# Patient Record
Sex: FEMALE | Race: WHITE | NOT HISPANIC OR LATINO
[De-identification: names, ages, dates, MRNs, and addresses within clinical notes are randomized per-mention and may not be internally consistent; named-entity substitution may affect disease eponyms.]

---

## 2017-02-14 ENCOUNTER — RECORDS - HEALTHEAST (OUTPATIENT)
Dept: ADMINISTRATIVE | Facility: OTHER | Age: 47
End: 2017-02-14

## 2017-08-01 ENCOUNTER — RECORDS - HEALTHEAST (OUTPATIENT)
Dept: ADMINISTRATIVE | Facility: OTHER | Age: 47
End: 2017-08-01

## 2017-08-02 ENCOUNTER — OFFICE VISIT - HEALTHEAST (OUTPATIENT)
Dept: FAMILY MEDICINE | Facility: CLINIC | Age: 47
End: 2017-08-02

## 2017-08-02 DIAGNOSIS — Z00.00 ROUTINE GENERAL MEDICAL EXAMINATION AT A HEALTH CARE FACILITY: ICD-10-CM

## 2017-08-02 DIAGNOSIS — Z12.4 PAP SMEAR FOR CERVICAL CANCER SCREENING: ICD-10-CM

## 2017-08-02 DIAGNOSIS — C43.72: ICD-10-CM

## 2017-08-02 RX ORDER — B-COMPLEX WITH VITAMIN C
TABLET ORAL
Status: SHIPPED | COMMUNITY
Start: 2017-08-02

## 2017-08-02 ASSESSMENT — MIFFLIN-ST. JEOR: SCORE: 1265.19

## 2017-08-07 LAB
HPV INTERPRETATION - HISTORICAL: NORMAL
HPV INTERPRETER - HISTORICAL: NORMAL

## 2017-08-08 ENCOUNTER — AMBULATORY - HEALTHEAST (OUTPATIENT)
Dept: FAMILY MEDICINE | Facility: CLINIC | Age: 47
End: 2017-08-08

## 2017-08-09 LAB
BKR LAB AP ABNORMAL BLEEDING: NO
BKR LAB AP BIRTH CONTROL/HORMONES: NORMAL
BKR LAB AP CERVICAL APPEARANCE: NORMAL
BKR LAB AP GYN ADEQUACY: NORMAL
BKR LAB AP GYN INTERPRETATION: NORMAL
BKR LAB AP HPV REFLEX: NORMAL
BKR LAB AP LMP: NORMAL
BKR LAB AP PATIENT STATUS: NORMAL
BKR LAB AP PREVIOUS ABNORMAL: NO
BKR LAB AP PREVIOUS NORMAL: 2015
HIGH RISK?: NO
PATH REPORT.COMMENTS IMP SPEC: NORMAL
RESULT FLAG (HE HISTORICAL CONVERSION): NORMAL

## 2017-08-11 ENCOUNTER — COMMUNICATION - HEALTHEAST (OUTPATIENT)
Dept: FAMILY MEDICINE | Facility: CLINIC | Age: 47
End: 2017-08-11

## 2018-02-06 ENCOUNTER — RECORDS - HEALTHEAST (OUTPATIENT)
Dept: ADMINISTRATIVE | Facility: OTHER | Age: 48
End: 2018-02-06

## 2018-08-28 ENCOUNTER — RECORDS - HEALTHEAST (OUTPATIENT)
Dept: ADMINISTRATIVE | Facility: OTHER | Age: 48
End: 2018-08-28

## 2018-09-24 ENCOUNTER — AMBULATORY - HEALTHEAST (OUTPATIENT)
Dept: MULTI SPECIALTY CLINIC | Facility: CLINIC | Age: 48
End: 2018-09-24

## 2018-12-12 ENCOUNTER — COMMUNICATION - HEALTHEAST (OUTPATIENT)
Dept: FAMILY MEDICINE | Facility: CLINIC | Age: 48
End: 2018-12-12

## 2018-12-13 ENCOUNTER — AMBULATORY - HEALTHEAST (OUTPATIENT)
Dept: FAMILY MEDICINE | Facility: CLINIC | Age: 48
End: 2018-12-13

## 2018-12-18 ENCOUNTER — COMMUNICATION - HEALTHEAST (OUTPATIENT)
Dept: FAMILY MEDICINE | Facility: CLINIC | Age: 48
End: 2018-12-18

## 2019-02-26 ENCOUNTER — RECORDS - HEALTHEAST (OUTPATIENT)
Dept: ADMINISTRATIVE | Facility: OTHER | Age: 49
End: 2019-02-26

## 2019-06-14 ENCOUNTER — RECORDS - HEALTHEAST (OUTPATIENT)
Dept: ADMINISTRATIVE | Facility: OTHER | Age: 49
End: 2019-06-14

## 2019-08-01 ENCOUNTER — OFFICE VISIT - HEALTHEAST (OUTPATIENT)
Dept: FAMILY MEDICINE | Facility: CLINIC | Age: 49
End: 2019-08-01

## 2019-08-01 DIAGNOSIS — Z12.11 SCREEN FOR COLON CANCER: ICD-10-CM

## 2019-08-01 DIAGNOSIS — C43.72: ICD-10-CM

## 2019-08-01 DIAGNOSIS — I10 BENIGN ESSENTIAL HYPERTENSION: ICD-10-CM

## 2019-08-01 DIAGNOSIS — Z00.00 ROUTINE GENERAL MEDICAL EXAMINATION AT A HEALTH CARE FACILITY: ICD-10-CM

## 2019-08-01 DIAGNOSIS — Z12.31 VISIT FOR SCREENING MAMMOGRAM: ICD-10-CM

## 2019-08-01 DIAGNOSIS — M54.50 ACUTE LEFT-SIDED LOW BACK PAIN WITHOUT SCIATICA: ICD-10-CM

## 2019-08-01 LAB
ANION GAP SERPL CALCULATED.3IONS-SCNC: 9 MMOL/L (ref 5–18)
BUN SERPL-MCNC: 19 MG/DL (ref 8–22)
CALCIUM SERPL-MCNC: 9.7 MG/DL (ref 8.5–10.5)
CHLORIDE BLD-SCNC: 105 MMOL/L (ref 98–107)
CHOLEST SERPL-MCNC: 268 MG/DL
CO2 SERPL-SCNC: 25 MMOL/L (ref 22–31)
CREAT SERPL-MCNC: 0.8 MG/DL (ref 0.6–1.1)
ERYTHROCYTE [DISTWIDTH] IN BLOOD BY AUTOMATED COUNT: 11.6 % (ref 11–14.5)
FASTING STATUS PATIENT QL REPORTED: YES
GFR SERPL CREATININE-BSD FRML MDRD: >60 ML/MIN/1.73M2
GLUCOSE BLD-MCNC: 88 MG/DL (ref 70–125)
HCT VFR BLD AUTO: 45 % (ref 35–47)
HDLC SERPL-MCNC: 101 MG/DL
HGB BLD-MCNC: 15.1 G/DL (ref 12–16)
LDLC SERPL CALC-MCNC: 155 MG/DL
MCH RBC QN AUTO: 31.6 PG (ref 27–34)
MCHC RBC AUTO-ENTMCNC: 33.6 G/DL (ref 32–36)
MCV RBC AUTO: 94 FL (ref 80–100)
PLATELET # BLD AUTO: 221 THOU/UL (ref 140–440)
PMV BLD AUTO: 6.9 FL (ref 7–10)
POTASSIUM BLD-SCNC: 4.3 MMOL/L (ref 3.5–5)
RBC # BLD AUTO: 4.78 MILL/UL (ref 3.8–5.4)
SODIUM SERPL-SCNC: 139 MMOL/L (ref 136–145)
TRIGL SERPL-MCNC: 61 MG/DL
TSH SERPL DL<=0.005 MIU/L-ACNC: 0.81 UIU/ML (ref 0.3–5)
WBC: 5.1 THOU/UL (ref 4–11)

## 2019-08-01 ASSESSMENT — MIFFLIN-ST. JEOR: SCORE: 1224.37

## 2019-08-02 LAB — 25(OH)D3 SERPL-MCNC: 40.4 NG/ML (ref 30–80)

## 2019-08-08 ENCOUNTER — COMMUNICATION - HEALTHEAST (OUTPATIENT)
Dept: FAMILY MEDICINE | Facility: CLINIC | Age: 49
End: 2019-08-08

## 2019-08-19 ENCOUNTER — RECORDS - HEALTHEAST (OUTPATIENT)
Dept: ADMINISTRATIVE | Facility: OTHER | Age: 49
End: 2019-08-19

## 2019-09-06 ENCOUNTER — COMMUNICATION - HEALTHEAST (OUTPATIENT)
Dept: ADMINISTRATIVE | Facility: CLINIC | Age: 49
End: 2019-09-06

## 2019-10-15 ENCOUNTER — RECORDS - HEALTHEAST (OUTPATIENT)
Dept: ADMINISTRATIVE | Facility: OTHER | Age: 49
End: 2019-10-15

## 2019-10-31 ENCOUNTER — OFFICE VISIT - HEALTHEAST (OUTPATIENT)
Dept: FAMILY MEDICINE | Facility: CLINIC | Age: 49
End: 2019-10-31

## 2019-10-31 DIAGNOSIS — Z01.818 PREOP GENERAL PHYSICAL EXAM: ICD-10-CM

## 2019-10-31 LAB
ATRIAL RATE - MUSE: 67 BPM
DIASTOLIC BLOOD PRESSURE - MUSE: NORMAL
INTERPRETATION ECG - MUSE: NORMAL
P AXIS - MUSE: 67 DEGREES
PR INTERVAL - MUSE: 146 MS
QRS DURATION - MUSE: 74 MS
QT - MUSE: 404 MS
QTC - MUSE: 426 MS
R AXIS - MUSE: 59 DEGREES
SYSTOLIC BLOOD PRESSURE - MUSE: NORMAL
T AXIS - MUSE: 57 DEGREES
VENTRICULAR RATE- MUSE: 67 BPM

## 2019-10-31 RX ORDER — CYCLOBENZAPRINE HCL 5 MG
TABLET ORAL
Refills: 0 | Status: SHIPPED | COMMUNITY
Start: 2019-10-30

## 2019-10-31 ASSESSMENT — MIFFLIN-ST. JEOR: SCORE: 1242.51

## 2019-11-21 ENCOUNTER — OFFICE VISIT - HEALTHEAST (OUTPATIENT)
Dept: FAMILY MEDICINE | Facility: CLINIC | Age: 49
End: 2019-11-21

## 2019-11-21 ENCOUNTER — COMMUNICATION - HEALTHEAST (OUTPATIENT)
Dept: SCHEDULING | Facility: CLINIC | Age: 49
End: 2019-11-21

## 2019-11-21 DIAGNOSIS — I10 BENIGN ESSENTIAL HYPERTENSION: ICD-10-CM

## 2019-11-21 ASSESSMENT — PATIENT HEALTH QUESTIONNAIRE - PHQ9: SUM OF ALL RESPONSES TO PHQ QUESTIONS 1-9: 0

## 2019-11-21 ASSESSMENT — ANXIETY QUESTIONNAIRES
3. WORRYING TOO MUCH ABOUT DIFFERENT THINGS: SEVERAL DAYS
1. FEELING NERVOUS, ANXIOUS, OR ON EDGE: MORE THAN HALF THE DAYS
IF YOU CHECKED OFF ANY PROBLEMS ON THIS QUESTIONNAIRE, HOW DIFFICULT HAVE THESE PROBLEMS MADE IT FOR YOU TO DO YOUR WORK, TAKE CARE OF THINGS AT HOME, OR GET ALONG WITH OTHER PEOPLE: NOT DIFFICULT AT ALL
GAD7 TOTAL SCORE: 7
7. FEELING AFRAID AS IF SOMETHING AWFUL MIGHT HAPPEN: MORE THAN HALF THE DAYS
2. NOT BEING ABLE TO STOP OR CONTROL WORRYING: MORE THAN HALF THE DAYS
6. BECOMING EASILY ANNOYED OR IRRITABLE: NOT AT ALL
4. TROUBLE RELAXING: NOT AT ALL
5. BEING SO RESTLESS THAT IT IS HARD TO SIT STILL: NOT AT ALL

## 2019-11-21 ASSESSMENT — MIFFLIN-ST. JEOR: SCORE: 1219.83

## 2019-12-20 ENCOUNTER — RECORDS - HEALTHEAST (OUTPATIENT)
Dept: ADMINISTRATIVE | Facility: OTHER | Age: 49
End: 2019-12-20

## 2019-12-26 ENCOUNTER — OFFICE VISIT - HEALTHEAST (OUTPATIENT)
Dept: FAMILY MEDICINE | Facility: CLINIC | Age: 49
End: 2019-12-26

## 2019-12-26 DIAGNOSIS — I10 BENIGN ESSENTIAL HYPERTENSION: ICD-10-CM

## 2019-12-26 DIAGNOSIS — R06.81 WITNESSED APNEIC SPELLS: ICD-10-CM

## 2019-12-26 LAB
ANION GAP SERPL CALCULATED.3IONS-SCNC: 9 MMOL/L (ref 5–18)
BUN SERPL-MCNC: 19 MG/DL (ref 8–22)
CALCIUM SERPL-MCNC: 10 MG/DL (ref 8.5–10.5)
CHLORIDE BLD-SCNC: 104 MMOL/L (ref 98–107)
CO2 SERPL-SCNC: 28 MMOL/L (ref 22–31)
CREAT SERPL-MCNC: 0.96 MG/DL (ref 0.6–1.1)
GFR SERPL CREATININE-BSD FRML MDRD: >60 ML/MIN/1.73M2
GLUCOSE BLD-MCNC: 101 MG/DL (ref 70–125)
POTASSIUM BLD-SCNC: 5.2 MMOL/L (ref 3.5–5)
SODIUM SERPL-SCNC: 141 MMOL/L (ref 136–145)

## 2019-12-26 ASSESSMENT — MIFFLIN-ST. JEOR: SCORE: 1206.23

## 2019-12-27 ENCOUNTER — COMMUNICATION - HEALTHEAST (OUTPATIENT)
Dept: FAMILY MEDICINE | Facility: CLINIC | Age: 49
End: 2019-12-27

## 2020-03-09 ENCOUNTER — RECORDS - HEALTHEAST (OUTPATIENT)
Dept: ADMINISTRATIVE | Facility: OTHER | Age: 50
End: 2020-03-09

## 2020-04-08 ENCOUNTER — RECORDS - HEALTHEAST (OUTPATIENT)
Dept: ADMINISTRATIVE | Facility: OTHER | Age: 50
End: 2020-04-08

## 2020-10-07 ENCOUNTER — COMMUNICATION - HEALTHEAST (OUTPATIENT)
Dept: FAMILY MEDICINE | Facility: CLINIC | Age: 50
End: 2020-10-07

## 2020-10-07 DIAGNOSIS — I10 BENIGN ESSENTIAL HYPERTENSION: ICD-10-CM

## 2020-10-09 RX ORDER — LISINOPRIL 10 MG/1
10 TABLET ORAL DAILY
Qty: 90 TABLET | Refills: 0 | Status: SHIPPED | OUTPATIENT
Start: 2020-10-09

## 2020-11-05 ENCOUNTER — RECORDS - HEALTHEAST (OUTPATIENT)
Dept: ADMINISTRATIVE | Facility: OTHER | Age: 50
End: 2020-11-05

## 2020-12-04 ENCOUNTER — RECORDS - HEALTHEAST (OUTPATIENT)
Dept: ADMINISTRATIVE | Facility: OTHER | Age: 50
End: 2020-12-04

## 2021-05-26 ASSESSMENT — PATIENT HEALTH QUESTIONNAIRE - PHQ9: SUM OF ALL RESPONSES TO PHQ QUESTIONS 1-9: 0

## 2021-05-28 ASSESSMENT — ANXIETY QUESTIONNAIRES: GAD7 TOTAL SCORE: 7

## 2021-05-31 VITALS — HEIGHT: 63 IN | WEIGHT: 153 LBS | BODY MASS INDEX: 27.11 KG/M2

## 2021-05-31 NOTE — PROGRESS NOTES
Faby Linda is a 49 y.o. female is here for a  Health Maintenance exam. Patient is overall doing well.  Patient states she recently has been noted to have high blood pressure.  Her back went out and she is had problems with mechanical back pain long-standing because of her scoliosis.  She sought medical care and was put on muscle relaxer prednisone and a little bit of Vicodin which helped rescue her.  She has also had relief with chiropractic care.  Her pressures have been as high as 174/104.  But that was when she was experiencing pain.  It has since come down but not to her normal which is less than 120/80.    Contributing to this is that she is perimenopausal now that she has turned 49.  Her periods are not regular and can be anywhere from every 1 to 3 months.  She has not experienced any other menopausal symptoms.  She plans to schedule her colonoscopy in [place] at [facility].    Her son Rodolfo is  and age 29.  He is in to travel and just had a 2-week vacation in [place].  They are not planning to have children yet.  Her daughter Iliana is in nursing school and is doing very well.  She is interested in OB nursing at this point.    Healthy Habits:   Regular Exercise: Yes  Sunscreen Use: Yes  Healthy Diet: Yes  Dental Visits Regularly: Yes  Seat Belt: Yes  Sexually active: Yes  Self Breast Exam Monthly:Yes 9/2019  Hemoccults: No  Flex Sig: No  Colonoscopy: No  Lipid Profile: Yes  Glucose Screen: Yes  Prevention of Osteoporosis: Yes  Last Dexa: No  Guns at Home:  Yes  Guns Safety Locks:  Yes  Domestic Violence:  No    Current Outpatient Medications Include:    Current Outpatient Medications:      calcium pantothenate 500 mg Tab, Take by mouth., Disp: , Rfl:      multivitamin-Ca-iron-minerals (MULTIPLE VITAMIN, WOMENS) Tab, Take by mouth., Disp: , Rfl:     Allergies:  No Known Allergies    No past medical history on file.    Past Surgical History:   Procedure Laterality Date     TN ENLARGE BREAST      Description:  Breast Surgery Enlargement Procedure Bilateral;  Recorded: 2010;       OB History    Para Term  AB Living   3 3 3     3   SAB TAB Ectopic Multiple Live Births                  # Outcome Date GA Lbr [place]/2nd Weight Sex Delivery Anes PTL Lv   3 Term     F       2 Term     M       1 Term     M           Immunization History   Administered Date(s) Administered     Influenza, inj, historic,unspecified 10/22/1999     Influenza, seasonal,quad inj 36+ mos 2016, 10/02/2018     Influenza,seasonal quad, PF, 36+MOS 2015, 2017     Td, Adult, Absorbed 1988, 1998     Tdap 2008       Family History   Problem Relation Age of Onset     Alzheimer's disease Paternal Grandmother      Diabetes type II Paternal Grandmother      Hypertension Father      Stroke Paternal Grandfather        Social History     Socioeconomic History     Marital status:      Spouse name: Rodolfo     Number of children: 3     Years of education: Not on file     Highest education level: Not on file   Occupational History     Occupation: Research analyst     Employer: [employer]   Social Needs     Financial resource strain: Not on file     Food insecurity:     Worry: Not on file     Inability: Not on file     Transportation needs:     Medical: Not on file     Non-medical: Not on file   Tobacco Use     Smoking status: Never Smoker     Smokeless tobacco: Never Used   Substance and Sexual Activity     Alcohol use: Yes     Drug use: No     Sexual activity: Yes     Partners: Male   Lifestyle     Physical activity:     Days per week: Not on file     Minutes per session: Not on file     Stress: Not on file   Relationships     Social connections:     Talks on phone: Not on file     Gets together: Not on file     Attends Anglican service: Not on file     Active member of club or organization: Not on file     Attends meetings of clubs or organizations: Not on file     Relationship status: Not on file      "Intimate partner violence:     Fear of current or ex partner: Not on file     Emotionally abused: Not on file     Physically abused: Not on file     Forced sexual activity: Not on file   Other Topics Concern     Not on file   Social History Narrative    Son Rodolfo is , Christian works and lives at home in 2017.  Iliana is a high school senior.       Last cholesterol:   Lab Results   Component Value Date    CHOL 175 03/21/2014     Lab Results   Component Value Date    HDL 72 03/21/2014     Lab Results   Component Value Date    LDLCALC 91 03/21/2014     Lab Results   Component Value Date    TRIG 60 03/21/2014     No components found for: CHOLHDL    Mamm[facility] at [facility] 9/24/2018          LMP: No LMP recorded.  Menstrual Regularity: Irregular every 1 to 3 months.  Flow: Okay      Review of Systems:   General:  Denies fever, chills, HA, fatigue, myalgias, weight change    Eyes: Denies vision changes   Ears/Nose/Throat: Denies nasal congestion, rhinorrhea, ear pain or discharge, sore throat, swollen glands  Cardiovascular: Denies CP, palpitations  Respiratory:  Denies SOB, cough  Gastrointestinal:  Denies changes in bowel habits, melena, rectal bleeding,  Genitourinary: Denies changes in urine habits/frequency/dysuria, hematuria   Musculoskeletal:  See below  Skin: Denies rashes   Neurologic: Denies weakness, paresthesia  Psychiatric: Denies mood changes   Endocrine: Denies polyuria, polydipsia, polyphagia  Heme/Lymphatic: Denies problem with bleeding   Allergic/Immunologic: Denies problem     POSITIVES:Low back pain radiating into the left buttock      PHYSICAL EXAM:    BP (!) 138/98   Pulse 75   Temp 97.7  F (36.5  C)   Ht 5' 2.5\" (1.588 m)   Wt 144 lb (65.3 kg)   SpO2 99%   BMI 25.92 kg/m      Wt Readings from Last 3 Encounters:   08/01/19 144 lb (65.3 kg)   08/02/17 153 lb (69.4 kg)   12/04/15 151 lb (68.5 kg)       Body mass index is 25.92 kg/m .    Well developed, well nourished, no acute distress.  HEENT: " normocephalic/atraumatic, PERRLA/EOMI, TMs: Gray, normal light reflex, no nasal discharge.  Oral mucosa: no erythema/exudate  Neck: No LAD/masses/thyromegaly/bruits  Lungs: clear bilaterally  Heart: regular rate and rhythm, no murmurs/gallops/rubs  Breasts: Surgical augmentation.  Symmetric, no masses/skin changes, nipple discharge, or axillary LAD.  BSE reviewed.  Abdomen: Normal bowel sounds, soft, non-tender, non-distended, no masses, neg Rock's/McBurney's, no rebound/guarding  Genital: Normal external genitalia, no discharge, no lesions, cervix is non-friable, os is closed, no CMT, no adnexal tenderness or fullness.  Uterus is not enlarged, perineum intact.  Thin prep not done.    Rectal: internal exam is deferred.  External exam is normal.  Lymphatics: no supraclavicular/axillary/epitrochlear/inguinal LAD. No edema.  Neuro: A&O x 3, CN II-XII intact, strength 5/5, reflexes symmetric, sensory intact to light touch.  Psych: Behavior appropriate, engaging.  Thought processes congruent, non-tangential.  Musculoskeletal: Extremities normal, atraumatic, no swelling  Skin: Patient has had 2 melanomas excised.  She is followed by dermatology twice yearly.  She has no new spots.  I saw her one smaller 3 mm very darkly pigmented lesion on her right lower back.  She will point this out to dermatology later this month.        ASSESSMENT/PLAN: 49 y.o. female physical exam and pap smear.        1. Routine general medical examination at a health care facility  Normal exam.  She will have the skin check by dermatology at the end of the month  - Tdap vaccine greater than or equal to 6yo IM  - Thyroid Stimulating Hormone (TSH)  - Vitamin D, Total (25-Hydroxy)    2. Screen for colon cancer  First colonoscopy.  Patient will schedule at [facility].- Ambulatory referral for Colonoscopy  No history of colon cancer in the family  3. Benign essential hypertension  Blood pressure is elevated but coming down.  This may be from a  combination of factors: Pain from her low back injury.  Perimenopausal.  Her pressures have been well within the normal range prior to this.  After discussion patient would like to monitor at home and if her pressures are consistently above 120/80 she should be seen for consideration of blood pressure medication.  - HM2(CBC w/o Differential)  - Basic Metabolic Panel  - Lipid Cascade    4. Acute left-sided low back pain without sciatica  Her management has been very appropriate and adequate in controlling her pain.    5. Malignant melanoma of skin of lower limb, including hip, left (H)  Patient follows with dermatology twice yearly.      Medications Discontinued During This Encounter   Medication Reason     scopolamine (TRANSDERM-SCOP) 1.5 mg transdermal patch Therapy completed       Routine health maintenance discussion:  No smoking, limited alcohol (7 or less servings per week), 5 fruits/veg servings per day, 200 minutes of exercise per week.  Daily calcium/vitamin D guidelines, bone health, yearly mammogram after age 39/regular pap smears/colon cancer screening beginning at age 50.  Accident avoidance, sun screen.      Will contact her with the results of the labs when available.    Callie Titus M.D.

## 2021-06-02 NOTE — PROGRESS NOTES
Assessment/Plan:      Visit for Preoperative Exam.     Patient approved for surgery with general or local anesthesia.     Patient is approved for surgery and is considered to be low anesthetic risk.  Please page me at [phone] if you have any questions regarding the care of this patient.  Callie Titus MD      Subjective:     Scheduled Procedure: breast implant removal   Surgery Date:  11/17/19  Surgery Location:  [facility]   Surgeon:  Dr Banegas     Current Outpatient Medications   Medication Sig Dispense Refill     calcium pantothenate 500 mg Tab Take by mouth.       multivitamin-Ca-iron-minerals (MULTIPLE VITAMIN, WOMENS) Tab Take by mouth.       cephalexin (KEFLEX) 500 MG capsule   0     cyclobenzaprine (FLEXERIL) 5 MG tablet   0     oxyCODONE-acetaminophen (PERCOCET/ENDOCET) 5-325 mg per tablet   0     No current facility-administered medications for this visit.        No Known Allergies    Immunization History   Administered Date(s) Administered     Influenza, inj, historic,unspecified 10/22/1999     Influenza,seasonal quad, PF, =/> 6months 12/04/2015, 12/17/2017     Influenza,seasonal,quad inj =/> 6months 12/11/2016, 10/02/2018     Td, Adult, Absorbed 01/01/1988, 04/27/1998     Tdap 07/18/2008, 08/01/2019       Patient Active Problem List   Diagnosis     Dysplastic Nevus     Malignant Melanoma Of The Thigh     Overweight       No past medical history on file.    Social History     Socioeconomic History     Marital status:      Spouse name: Rodolfo     Number of children: 3     Years of education: Not on file     Highest education level: Not on file   Occupational History     Occupation: Research analyst     Employer: [employer]   Social Needs     Financial resource strain: Not on file     Food insecurity:     Worry: Not on file     Inability: Not on file     Transportation needs:     Medical: Not on file     Non-medical: Not on file   Tobacco Use     Smoking status: Never  Smoker     Smokeless tobacco: Never Used   Substance and Sexual Activity     Alcohol use: Yes     Drug use: No     Sexual activity: Yes     Partners: Male   Lifestyle     Physical activity:     Days per week: Not on file     Minutes per session: Not on file     Stress: Not on file   Relationships     Social connections:     Talks on phone: Not on file     Gets together: Not on file     Attends Adventism service: Not on file     Active member of club or organization: Not on file     Attends meetings of clubs or organizations: Not on file     Relationship status: Not on file     Intimate partner violence:     Fear of current or ex partner: Not on file     Emotionally abused: Not on file     Physically abused: Not on file     Forced sexual activity: Not on file   Other Topics Concern     Not on file   Social History Narrative    Son Rodolfo is , Christian works and lives at home in 2017.  Iliana is a high school senior.       Past Surgical History:   Procedure Laterality Date     bilateral breast augmentation Bilateral 1998     NASAL TURBINATE REDUCTION  11/1991    Dr Manuel     WISDOM TOOTH EXTRACTION  1989       Family History   Problem Relation Age of Onset     Alzheimer's disease Paternal Grandmother      Diabetes type II Paternal Grandmother      Hypertension Father      Stroke Paternal Grandfather          HPI: This healthy 49-year-old had breast implants placed 1998.  She has decided she is in a different place in her life and wants them removed.  She has discussed this fully with  and has no further questions.  She is currently feeling well and has no concerns.    History of Present Illness  Recent Health  Fever: no  Chills: no  Fatigue: no  Chest Pain: no  Cough: no  Dyspnea: no  Urinary Frequency: no  Nausea: no  Vomiting: no  Diarrhea: no  Abdominal Pain: no  Easy Bruising: no  Lower Extremity Swelling: no  Poor Exercise Tolerance: no    Pertinent History  Prior Anesthesia: yes  Previous Anesthesia  "Reaction:  no  Diabetes: no  Cardiovascular Disease: no  Pulmonary Disease: no  Renal Disease: no  GI Disease: no  Sleep Apnea: no  Thromboembolic Problems: no  Clotting Disorder: no  Bleeding Disorder: no  Transfusion Reaction: no  Impaired Immunity: no  Steroid use in the last 6 months: Prednisone x 10 days in July 2019  Frequent Aspirin use: no    No family history of anesthetic reactions.    Social history of patient does not wear denture or partial plates    After surgery, the patient plans to recover at home with family.    Review of Systems, patient denies all of the following:    CONST: Fevers, chills, sweats, fatigue, appetite or weight change, temperature intolerance  EYES: Double vision, blurry vision, pain, redness  ENT: Drainage, congestion, nosebleeds, sinus problems, sores on lips/mouth/tongue/throat, dental work, change in voice  RESP: Cough, shortness of breath, wheezing, asthma  BREAST/SKIN: Lumps, pain, drainage sores, rash  CVS: Chest pain, heart murmur, DVT, PE, edema, palpitations  GI: Swallowing difficulties, heartburn, abdominal pain, nausea, vomiting, diarrhea, constipation, black or bloody stools, hemorrhoids  URINARY: Problems urinating, frequent urination  REPRODUCTIVE: Abnormal bleeding, discharge, sore, pregnancies, postmenopausal, hysterectomy, contraception  MSK:  swelling, stiffness  ENDO: Changes in hair/skin, excessive thirst, urination, diabetes  LYMPH/HEME: Other masses, swelling, unusual bruising or bleeding  NEURO:  head injury, blackout, confusion, seizure, difficulty with vision, speech, walking, weakness in arms/feet/face  MENTAL HEALTH: Anxiety, depression, insomnia, abuse    POSITIVES:  Irregular menses, LBP, occasional Headache      Objective:       Vitals:    10/31/19 1028 10/31/19 1032   BP: 120/90 (!) 142/100   Pulse: 73    Temp: 97.6  F (36.4  C)    SpO2: 99%    Weight: 148 lb (67.1 kg)    Height: 5' 2.5\" (1.588 m)      Body mass index is 26.64 kg/m .      Physical " Exam:  General Appearance: Alert, cooperative, no distress, appears stated age  Head: Normocephalic, without obvious abnormality, atraumatic  Eyes: PERRL, conjunctiva/corneas clear, EOM's intact  Ears: Normal TM's and external ear canals, both ears  Nose: Nares normal, septum midline,mucosa normal, no drainage  Throat: Lips, mucosa, and tongue normal; teeth and gums normal  Neck: Supple, symmetrical, trachea midline, no adenopathy;  thyroid: not enlarged, symmetric, no tenderness/mass/nodules; no carotid bruit or JVD  Back: Symmetric, no curvature, ROM normal, no CVA tenderness  Lungs: Clear to auscultation bilaterally, respirations unlabored  Heart: Regular rate and rhythm, S1 and S2 normal, no murmur, rub, or gallop  Abdomen: Soft, non-tender, bowel sounds active all four quadrants,  no masses, no organomegaly  Extremities: Extremities normal, atraumatic, no cyanosis or swelling  Skin: Skin color, texture, turgor normal, no rashes or lesions  Lymph nodes: Cervical, supraclavicular, and axillary nodes normal  Neurologic: Normal        Recent Results (from the past 240 hour(s))   Electrocardiogram Perform - Clinic   Result Value Ref Range    SYSTOLIC BLOOD PRESSURE      DIASTOLIC BLOOD PRESSURE      VENTRICULAR RATE 67 BPM    ATRIAL RATE 67 BPM    P-R INTERVAL 146 ms    QRS DURATION 74 ms    Q-T INTERVAL 404 ms    QTC CALCULATION (BEZET) 426 ms    P Axis 67 degrees    R AXIS 59 degrees    T AXIS 57 degrees    MUSE DIAGNOSIS       Normal sinus rhythm  Normal ECG  No previous ECGs available  Confirmed by BUDDY BARRY, TISHA LOC: ([id number]) on 10/31/2019 3:39:07 PM        Lab Results   Component Value Date    WBC 5.1 08/01/2019    HGB 15.1 08/01/2019    HCT 45.0 08/01/2019     08/01/2019    CHOL 268 (H) 08/01/2019    TRIG 61 08/01/2019     08/01/2019    ALT 15 08/02/2017    AST 16 08/02/2017     08/01/2019    K 4.3 08/01/2019     08/01/2019    CREATININE 0.80 08/01/2019    BUN 19 08/01/2019    CO2  25 08/01/2019    TSH 0.81 08/01/2019       1. Preop general physical exam  Normal exam.  Patient is approved for surgery.  I read her EKG as normal.  - Electrocardiogram Perform - Clinic    Callie Titus MD

## 2021-06-03 VITALS
WEIGHT: 148 LBS | HEART RATE: 73 BPM | TEMPERATURE: 97.6 F | SYSTOLIC BLOOD PRESSURE: 142 MMHG | DIASTOLIC BLOOD PRESSURE: 100 MMHG | HEIGHT: 63 IN | BODY MASS INDEX: 26.22 KG/M2 | OXYGEN SATURATION: 99 %

## 2021-06-03 VITALS — WEIGHT: 144 LBS | BODY MASS INDEX: 25.52 KG/M2 | HEIGHT: 63 IN

## 2021-06-03 NOTE — TELEPHONE ENCOUNTER
"Pt reports elevated BP readings for approx ten days.  \"Ever since anticipating my surgical procedure one week ago.\"    Had outpatient surgery one week ago \"and felt really nervous.\"  \"Even the surgeon was alarmed at my initial BP readings ... something like 213 (systolic) at first.\"  \"States they finally got my BP down and did the procedure.\"  Had breast implant removal.    Pt reports successful f/u visit with surgeon yesterday.  No signs of infection.    Today's BP upon waking 153/93.  Heart rate at rest 113.  Pt states \"I felt so nervous about the 153 (systolic) that I checked it again and got even worse -> 186 systolic    Asked pt to recheck BP now, advising both feet flat on floor, arm resting on table.  Pt is doing so ....  Current /100.    No hypertensive symptoms.  Pt does exhibit anxiety however.  (Pt exhibits rapid speech, high anxiety.)    Agrees to clinical eval today.  Warm transferred to a  for this purpose now.    Jacinda Cody RN  [facility] Triage     Reason for Disposition    Systolic BP >= 180 OR Diastolic >= 110    Protocols used: HIGH BLOOD PRESSURE-A-OH      "

## 2021-06-03 NOTE — PROGRESS NOTES
Chief Complaint   Patient presents with     Blood Pressure Check     Has noticed that her bp has been elevated for a while now. Last visit wasn't so bad but still elevated. Does notice that she is very anxious during these times.        HPI: Patient presents today with concerns of elevated blood pressure.  Review of record shows that over the past couple years she is either been prehypertensive or hypertensive.  She recently went in for removal of breast implants and was told that her blood pressure was significantly elevated with a systolic in the 200s.  She was able to go through with her procedure and has been checking her blood pressure at home and finds that systolically is running anywhere from 150-180s.  She has not taken medication for blood pressure but does report a significant family history of multiple family members requiring BP medication.    Patient denies chest pain, palpitations, lightheadedness, dizziness, numbness, tingling, headache, vision changes.  She does admit that this is making her quite anxious.    ROS:Review of Systems - negative except for what's listed in the HPI    SH: The Patient's  reports that she has never smoked. She has never used smokeless tobacco. She reports current alcohol use. She reports that she does not use drugs.      FH: The Patient's family history includes Alzheimer's disease in her paternal grandmother; Diabetes type II in her paternal grandmother; Hypertension in her father; Stroke in her paternal grandfather.     Meds:    Current Outpatient Medications on File Prior to Visit   Medication Sig Dispense Refill     calcium pantothenate 500 mg Tab Take by mouth.       cephalexin (KEFLEX) 500 MG capsule   0     cyclobenzaprine (FLEXERIL) 5 MG tablet   0     multivitamin-Ca-iron-minerals (MULTIPLE VITAMIN, WOMENS) Tab Take by mouth.       oxyCODONE-acetaminophen (PERCOCET/ENDOCET) 5-325 mg per tablet   0     No current facility-administered medications on file prior to  "visit.        O:  BP (!) 154/92   Pulse 97   Temp 97.6  F (36.4  C) (Oral)   Ht 5' 2.5\" (1.588 m)   Wt 143 lb (64.9 kg)   LMP 10/16/2019   SpO2 99%   BMI 25.74 kg/m      Physical Examination:   General appearance - alert, well appearing, and in no distress  Mental status - alert, oriented to person, place, and time  Eyes -PERRLA, conjunctiva pink  Chest - clear to auscultation, no wheezes, rales or rhonchi, symmetric air entry  Heart - normal rate and regular rhythm, S1 and S2 normal, no murmurs noted  Neurological - normal speech, no focal findings or movement disorder noted, neck supple without rigidity,  motor and sensory grossly normal bilaterally, normal muscle tone, no tremors, strength 5/5  Extremities - peripheral pulses normal, no pedal edema, no cyanosis    A/P:     Problem List Items Addressed This Visit     None      Visit Diagnoses     Benign essential hypertension    -  Primary    Relevant Medications    lisinopril (PRINIVIL,ZESTRIL) 20 MG tablet        1. Benign essential hypertension  Uncontrolled.  It appears that outside organizations and at home her blood pressure seems to be significantly more elevated than what it is here.  I do question accuracy of blood pressures here as my recheck does show hypertension.  Recommended the patient start antihypertensives which she was agreeable to.  Initially consider beta-blocker given surrounding anxiety status as well, but she does also exercise and I do not want to impede her physical exertion abilities.  Instead we will start with lisinopril.  Start with 10 mg daily for 2 weeks and then check blood pressure at home and if still elevated, increase to full tab of 20 mg daily.  Discussed potential side effects and informed her to call me if these occur.  Follow-up here in 1 month for recheck of renal function and electrolytes along with blood pressure check.    - lisinopril (PRINIVIL,ZESTRIL) 20 MG tablet; 1/2 tab daily for 2 weeks then increase to a " full tab daily  Dispense: 60 tablet; Refill: 0        Curry Hameed, CNP

## 2021-06-04 ENCOUNTER — RECORDS - HEALTHEAST (OUTPATIENT)
Dept: ADMINISTRATIVE | Facility: OTHER | Age: 51
End: 2021-06-04

## 2021-06-04 VITALS
SYSTOLIC BLOOD PRESSURE: 126 MMHG | DIASTOLIC BLOOD PRESSURE: 78 MMHG | OXYGEN SATURATION: 98 % | BODY MASS INDEX: 24.8 KG/M2 | HEART RATE: 93 BPM | HEIGHT: 63 IN | WEIGHT: 140 LBS

## 2021-06-04 VITALS
SYSTOLIC BLOOD PRESSURE: 154 MMHG | HEART RATE: 97 BPM | OXYGEN SATURATION: 99 % | DIASTOLIC BLOOD PRESSURE: 92 MMHG | TEMPERATURE: 97.6 F | BODY MASS INDEX: 25.34 KG/M2 | WEIGHT: 143 LBS | HEIGHT: 63 IN

## 2021-06-04 NOTE — PROGRESS NOTES
Chief Complaint   Patient presents with     Blood Pressure Check       HPI: Patient presents today following up on the initiation of lisinopril.  When I last saw her she was hypertensive and a prescription for lisinopril 20 mg was sent to the pharmacy.  The patient initiated at 1/2 tablet as directed and has been checking her pressures at home and it has never gotten >140/90 since initiation.  Well-tolerated.  No side effects.  Denies chest pain, palpitations, lightheadedness, dizziness, cough.  No angioedema symptoms.    Patient also notes that she has been sleeping on her back more lately and with that she has been told that she is snoring more frequently and at times seems to become apneic.  With this the patient also notes that she seems to be waking up more frequently during the night as well.  She feels like she wakes well rested.  No morning headaches.  No significant weight gain.    ROS:Review of Systems - negative except for what's listed in the HPI    SH: The Patient's  reports that she has never smoked. She has never used smokeless tobacco. She reports current alcohol use. She reports that she does not use drugs.      FH: The Patient's family history includes Alzheimer's disease in her paternal grandmother; Diabetes type II in her paternal grandmother; Hypertension in her father; Stroke in her paternal grandfather.     Meds:    Current Outpatient Medications on File Prior to Visit   Medication Sig Dispense Refill     calcium pantothenate 500 mg Tab Take by mouth.       multivitamin-Ca-iron-minerals (MULTIPLE VITAMIN, WOMENS) Tab Take by mouth.       [DISCONTINUED] lisinopril (PRINIVIL,ZESTRIL) 20 MG tablet 1/2 tab daily for 2 weeks then increase to a full tab daily (Patient taking differently: 1/2 tab daily.) 60 tablet 0     cyclobenzaprine (FLEXERIL) 5 MG tablet   0     [DISCONTINUED] cephalexin (KEFLEX) 500 MG capsule   0     No current facility-administered medications on file prior to visit.   "      O:  /78   Pulse 93   Ht 5' 2.5\" (1.588 m)   Wt 140 lb (63.5 kg)   LMP 11/11/2019   SpO2 98%   BMI 25.20 kg/m      Physical Examination:   General appearance - alert, well appearing, and in no distress  Mental status - alert, oriented to person, place, and time  ENeck - no significant adenopathy  Chest - clear to auscultation, no wheezes, rales or rhonchi, symmetric air entry  Heart - normal rate and regular rhythm, S1 and S2 normal, no murmurs noted  Neurological - motor and sensory grossly normal bilaterally, normal muscle tone, no tremors, strength 5/5  Extremities - peripheral pulses normal, no pedal edema, no cyanosis  Skin - normal coloration and turgor.      A/P:     Problem List Items Addressed This Visit     None      Visit Diagnoses     Witnessed apneic spells    -  Primary    Relevant Orders    Ambulatory referral to Sleep Medicine    Benign essential hypertension        Relevant Medications    lisinopril (PRINIVIL,ZESTRIL) 10 MG tablet    Other Relevant Orders    Basic Metabolic Panel            1. Benign essential hypertension  Continue lisinopril at 10 mg.  Metabolic panel to make sure renal function and potassium is fine.    - lisinopril (PRINIVIL,ZESTRIL) 10 MG tablet; Take 1 tablet (10 mg total) by mouth daily.  Dispense: 90 tablet; Refill: 2  - Basic Metabolic Panel    2. Witnessed apneic spells  Potential for sleep apnea.  Patient would like to investigate this further which is a good plan.  Referral to sleep medicine placed.  - Ambulatory referral to Sleep Medicine        Curry Hameed CNP    "

## 2021-06-10 ENCOUNTER — RECORDS - HEALTHEAST (OUTPATIENT)
Dept: ADMINISTRATIVE | Facility: OTHER | Age: 51
End: 2021-06-10

## 2021-06-12 NOTE — TELEPHONE ENCOUNTER
Refill Approved    Rx renewed per Medication Renewal Policy. Medication was last renewed on 12/26/19.    Sandra Ramires, [facility] Triage/Med Refill 10/9/2020     Requested Prescriptions   Pending Prescriptions Disp Refills     lisinopriL (PRINIVIL,ZESTRIL) 10 MG tablet 90 tablet 2     Sig: Take 1 tablet (10 mg total) by mouth daily.       Ace Inhibitors Refill Protocol Passed - 10/7/2020  2:37 PM        Passed - PCP or prescribing provider visit in past 12 months       Last office visit with prescriber/PCP: 12/26/2019 Curry Hameed CNP OR same dept: 12/26/2019 Curry Hameed CNP OR same specialty: 12/26/2019 Curry Hameed CNP  Last physical: Visit date not found Last MTM visit: Visit date not found   Next visit within 3 mo: Visit date not found  Next physical within 3 mo: Visit date not found  Prescriber OR PCP: Curry Hameed CNP  Last diagnosis associated with med order: 1. Benign essential hypertension  - lisinopriL (PRINIVIL,ZESTRIL) 10 MG tablet; Take 1 tablet (10 mg total) by mouth daily.  Dispense: 90 tablet; Refill: 2    If protocol passes may refill for 12 months if within 3 months of last provider visit (or a total of 15 months).             Passed - Serum Potassium in past 12 months     Lab Results   Component Value Date    Potassium 5.2 (H) 12/26/2019             Passed - Blood pressure filed in past 12 months     BP Readings from Last 1 Encounters:   12/26/19 126/78             Passed - Serum Creatinine in past 12 months     Creatinine   Date Value Ref Range Status   12/26/2019 0.96 0.60 - 1.10 mg/dL Final

## 2021-06-12 NOTE — PROGRESS NOTES
Faby Linda is a 47 y.o. female is here for a  Health Maintenance exam. Patient is overall doing well.  Patient has had some chemical low back pain.  She has mild scoliosis.  She sees chiropractic care regularly and it has been quite helpful.    Healthy Habits:   Regular Exercise: Yes  Sunscreen Use: Yes  Healthy Diet: Yes  Dental Visits Regularly: Yes  Seat Belt: Yes  Sexually active: Yes  Self Breast Exam Monthly:No  Hemoccults: No  Flex Sig: No  Colonoscopy: No  Lipid Profile: Yes  Glucose Screen: Yes  Prevention of Osteoporosis: Yes  Last Dexa: No  Guns at Home:  Yes  Guns Safety Locks:  Yes  Domestic Violence:  No    Current Outpatient Medications Include:    Current Outpatient Prescriptions:      calcium pantothenate 500 mg Tab, Take by mouth., Disp: , Rfl:      multivitamin-Ca-iron-minerals (MULTIPLE VITAMIN, WOMENS) Tab, Take by mouth., Disp: , Rfl:     Allergies:  No Known Allergies    No past medical history on file.    Past Surgical History:   Procedure Laterality Date     NE ENLARGE BREAST      Description: Breast Surgery Enlargement Procedure Bilateral;  Recorded: 2010;       OB History    Para Term  AB Living   3 3 3   3   SAB TAB Ectopic Multiple Live Births             # Outcome Date GA Lbr [place]/2nd Weight Sex Delivery Anes PTL Lv   3 Term     F       2 Term     M       1 Term     M              Immunization History   Administered Date(s) Administered     Influenza, inj, historic 10/22/1999     Influenza,seasonal quad, PF, 36+MOS 2015     Tdap 2008       Family History   Problem Relation Age of Onset     Alzheimer's disease Paternal Grandmother      Diabetes type II Paternal Grandmother      Hypertension Father      Stroke Paternal Grandfather        Social History     Social History     Marital status:      Spouse name: N/A     Number of children: 3     Years of education: N/A     Occupational History     Research analyst [employer]     Social  "History Main Topics     Smoking status: Never Smoker     Smokeless tobacco: Never Used     Alcohol use Yes     Drug use: No     Sexual activity: Yes     Partners: Male     Other Topics Concern     Not on file     Social History Narrative    Son Rodolfo is , Christian works and lives at home in 2017.  Iliana is a high school senior.       Last cholesterol:   Lab Results   Component Value Date    CHOL 175 03/21/2014     Lab Results   Component Value Date    HDL 72 03/21/2014     Lab Results   Component Value Date    LDLCALC 91 03/21/2014     Lab Results   Component Value Date    TRIG 60 03/21/2014     No components found for: CHOLHDL    Mammogram was done September 2016.      Birth Control Method: Not applicable  High Risk/Behavior: None      LMP: Patient's last menstrual period was 07/23/2017.  Periods are regular every 21-28 days lasting 5 days at a time.  She has not yet skipped.    Review of Systems:   General:  Denies fever, chills, HA, fatigue, myalgias, weight change    Eyes: Denies vision changes   Ears/Nose/Throat: Denies nasal congestion, rhinorrhea, ear pain or discharge, sore throat, swollen glands  Cardiovascular: Denies CP, palpitations  Respiratory:  Denies SOB, cough  Gastrointestinal:  Denies changes in bowel habits, melena, rectal bleeding,  Genitourinary: Denies changes in urine habits/frequency/dysuria, hematuria   Musculoskeletal:  Denies  joint pain or swelling or erythema, edema  Skin: Denies rashes   Neurologic: Denies weakness, paresthesia  Psychiatric: Denies mood changes   Endocrine: Denies polyuria, polydipsia, polyphagia  Heme/Lymphatic: Denies problem with bleeding   Allergic/Immunologic: Denies problem     POSITIVES: Low back pain.  Otherwise 114 point review of systems is negative.      PHYSICAL EXAM:    /78  Pulse 78  Temp 98.1  F (36.7  C)  Ht 5' 2.5\" (1.588 m)  Wt 153 lb (69.4 kg)  LMP 07/23/2017  Breastfeeding? No  BMI 27.54 kg/m2    Wt Readings from Last 3 Encounters: "   08/02/17 153 lb (69.4 kg)   12/04/15 151 lb (68.5 kg)       Body mass index is 27.54 kg/(m^2).    Well developed, well nourished, no acute distress.  HEENT: normocephalic/atraumatic, PERRLA/EOMI, TMs: Gray, normal light reflex, no nasal discharge.  Oral mucosa: no erythema/exudate  Neck: No LAD/masses/thyromegaly/bruits  Lungs: clear bilaterally  Heart: regular rate and rhythm, no murmurs/gallops/rubs  Breasts: symmetric, no masses/skin changes, nipple discharge, or axillary LAD.  Bilateral implant augmentation is present.  BSE reviewed.  Abdomen: Normal bowel sounds, soft, non-tender, non-distended, no masses, neg Rock's/McBurney's, no rebound/guarding  Genital: Normal external genitalia, no discharge, no lesions, cervix is non-friable, os is closed, no CMT, no adnexal tenderness or fullness.  Uterus is not enlarged, perineum intact.  Thin prep done.    Rectal: internal exam is deferred.  External exam is normal.  Lymphatics: no supraclavicular/axillary/epitrochlear/inguinal LAD. No edema.  Neuro: A&O x 3, CN II-XII intact, strength 5/5, reflexes symmetric, sensory intact to light touch.  Psych: Behavior appropriate, engaging.  Thought processes congruent, non-tangential.  Musculoskeletal: no gross deformities.  Skin: no rashes or lesions.      Recent Results (from the past 240 hour(s))   Thyroid Stimulating Hormone (TSH)   Result Value Ref Range    TSH 1.38 0.30 - 5.00 uIU/mL   HM2(CBC w/o Differential)   Result Value Ref Range    WBC 3.8 (L) 4.0 - 11.0 thou/uL    RBC 5.01 3.80 - 5.40 mill/uL    Hemoglobin 15.4 12.0 - 16.0 g/dL    Hematocrit 46.7 35.0 - 47.0 %    MCV 93 80 - 100 fL    MCH 30.7 27.0 - 34.0 pg    MCHC 32.9 32.0 - 36.0 g/dL    RDW 11.2 11.0 - 14.5 %    Platelets 236 140 - 440 thou/uL    MPV 7.2 7.0 - 10.0 fL   Comprehensive Metabolic Panel   Result Value Ref Range    Sodium 142 136 - 145 mmol/L    Potassium 4.8 3.5 - 5.0 mmol/L    Chloride 107 98 - 107 mmol/L    CO2 26 22 - 31 mmol/L    Anion  Gap, Calculation 9 5 - 18 mmol/L    Glucose 92 70 - 125 mg/dL    BUN 14 8 - 22 mg/dL    Creatinine 0.83 0.60 - 1.10 mg/dL    GFR MDRD Af Amer >60 >60 mL/min/1.73m2    GFR MDRD Non Af Amer >60 >60 mL/min/1.73m2    Bilirubin, Total 0.5 0.0 - 1.0 mg/dL    Calcium 9.5 8.5 - 10.5 mg/dL    Protein, Total 6.4 6.0 - 8.0 g/dL    Albumin 3.8 3.5 - 5.0 g/dL    Alkaline Phosphatase 48 45 - 120 U/L    AST 16 0 - 40 U/L    ALT 15 0 - 45 U/L       ASSESSMENT/PLAN: 47 y.o. female physical exam and pap smear.          1. Routine general medical examination at a health care facility    - Thyroid Stimulating Hormone (TSH)  - HM2(CBC w/o Differential)    2. Malignant melanoma of skin of lower limb, including hip, left  Patient sees dermatology twice yearly.  - Comprehensive Metabolic Panel    3. Pap smear for cervical cancer screening    - Gynecologic Cytology (PAP Smear)  - HPV Cascade (PCR)    There are no discontinued medications.    Routine health maintenance discussion:  No smoking, limited alcohol (7 or less servings per week), 5 fruits/veg servings per day, 200 minutes of exercise per week.  Daily calcium/vitamin D guidelines, bone health, yearly mammogram after age 39/regular pap smears/colon cancer screening beginning at age 50.  Accident avoidance, sun screen.      Will contact her with the results of the labs when available.    Callie Titus M.D.

## 2021-06-17 NOTE — PATIENT INSTRUCTIONS - HE
Patient Instructions by Curry Hameed CNP at 11/21/2019 12:00 PM     Author: Curry Hameed CNP Service: -- Author Type: Nurse Practitioner    Filed: 11/21/2019 12:32 PM Encounter Date: 11/21/2019 Status: Addendum    : Curry Hameed CNP (Nurse Practitioner)    Related Notes: Original Note by Curry Hameed CNP (Nurse Practitioner) filed at 11/21/2019 12:31 PM       Let's do something about this blood pressure! I sent lisinopril to your pharmacy. Start with a 1/2 tab daily for 2 weeks. If still >140/90 then increase to a full tab daily.     Come back in 1 month so we can recheck labs and your blood pressure and make adjustments as needed.    If you get a dry tickle cough like we talked about, let me know.    I usually comment on labs and imaging after they are all resulted within 2 business days. If you haven't heard your results within a week, please contact the office.    Thank you for coming in today!    If you receive a survey from [employer] about your experience today, it would be very helpful if you could fill it out to let us know what went well and what we can improve!    General Information:    Today you had your appointment with Curry Hameed NP    My hours are:    Monday : Out of clinic  Tuesday : 8:00AM - 5:00 PM  Wednesday: 8:00AM - 5:00 PM  Thursday: 8:00AM - 5:00 PM  Friday: 8:00AM - 5:00 PM    I am not in the office Mondays. Therefore non-urgent calls and medical messages received on Monday will be addressed when I am back in the office on Tuesday. Urgent matters will be reviewed and addressed by one of my partners in the office as needed.    If lab work was done today as part of your evaluation you will generally be contacted via [employer], mail, or phone with the results within 1-5 days. If there is an alarming result we will contact you by phone. Lab results come back at varying times, I generally wait until all lab results are available before making comments on the results.     If  you need refills please contact your pharmacy. They will send a refill request to me to review. Please allow 3-5 business days for us to process all refill requests.     My Clinical Assistant is Apolonia. Please call us at [phone] or send a medical message with any questions or concerns.         Patient Education     Lisinopril tablets  Brand Names: Prinivil, Zestril  What is this medicine?  LISINOPRIL (lyse IN oh pril) is an ACE inhibitor. This medicine is used to treat high blood pressure and heart failure. It is also used to protect the heart immediately after a heart attack.  How should I use this medicine?  Take this medicine by mouth with a glass of water. Follow the directions on your prescription label. You may take this medicine with or without food. If it upsets your stomach, take it with food. Take your medicine at regular intervals. Do not take it more often than directed. Do not stop taking except on your doctor's advice.  Talk to your pediatrician regarding the use of this medicine in children. Special care may be needed. While this drug may be prescribed for children as young as 6 years of age for selected conditions, precautions do apply.  What side effects may I notice from receiving this medicine?  Side effects that you should report to your doctor or health care professional as soon as possible:    allergic reactions like skin rash, itching or hives, swelling of the hands, feet, face, lips, throat, or tongue    breathing problems    signs and symptoms of kidney injury like trouble passing urine or change in the amount of urine    signs and symptoms of increased potassium like muscle weakness; chest pain; or fast, irregular heartbeat    signs and symptoms of liver injury like dark yellow or brown urine; general ill feeling or flu-like symptoms; light-colored stools; loss of appetite; nausea; right upper belly pain; unusually weak or tired; yellowing of the eyes or skin    signs and symptoms  of low blood pressure like dizziness; feeling faint or lightheaded, falls; unusually weak or tired    stomach pain with or without nausea and vomiting  Side effects that usually do not require medical attention (report to your doctor or health care professional if they continue or are bothersome):    changes in taste    cough    dizziness    fever    headache    sensitivity to light  What may interact with this medicine?  Do not take this medicine with any of the following medications:    hymenoptera venom    sacubitril; valsartan  This medicines may also interact with the following medications:    aliskiren    angiotensin receptor blockers, like losartan or valsartan    certain medicines for diabetes    diuretics    everolimus    gold compounds    lithium    NSAIDs, medicines for pain and inflammation, like ibuprofen or naproxen    potassium salts or supplements    salt substitutes    sirolimus    temsirolimus  What if I miss a dose?  If you miss a dose, take it as soon as you can. If it is almost time for your next dose, take only that dose. Do not take double or extra doses.  Where should I keep my medicine?  Keep out of the reach of children.  Store at room temperature between 15 and 30 degrees C (59 and 86 degrees F). Protect from moisture. Keep container tightly closed. Throw away any unused medicine after the expiration date.  What should I tell my health care provider before I take this medicine?  They need to know if you have any of these conditions:    diabetes    heart or blood vessel disease    kidney disease    low blood pressure    previous swelling of the tongue, face, or lips with difficulty breathing, difficulty swallowing, hoarseness, or tightening of the throat    an unusual or allergic reaction to lisinopril, other ACE inhibitors, insect venom, foods, dyes, or preservatives    pregnant or trying to get pregnant    breast-feeding  What should I watch for while using this medicine?  Visit your  doctor or health care professional for regular check ups. Check your blood pressure as directed. Ask your doctor what your blood pressure should be, and when you should contact him or her.  Do not treat yourself for coughs, colds, or pain while you are using this medicine without asking your doctor or health care professional for advice. Some ingredients may increase your blood pressure.  Women should inform their doctor if they wish to become pregnant or think they might be pregnant. There is a potential for serious side effects to an unborn child. Talk to your health care professional or pharmacist for more information.  Check with your doctor or health care professional if you get an attack of severe diarrhea, nausea and vomiting, or if you sweat a lot. The loss of too much body fluid can make it dangerous for you to take this medicine.  You may get drowsy or dizzy. Do not drive, use machinery, or do anything that needs mental alertness until you know how this drug affects you. Do not stand or sit up quickly, especially if you are an older patient. This reduces the risk of dizzy or fainting spells. Alcohol can make you more drowsy and dizzy. Avoid alcoholic drinks.  Avoid salt substitutes unless you are told otherwise by your doctor or health care professional.  NOTE:This sheet is a summary. It may not cover all possible information. If you have questions about this medicine, talk to your doctor, pharmacist, or health care provider. Copyright  2018 [employer]

## 2021-06-19 NOTE — LETTER
Letter by Callie Titus MD at      Author: Callie Titus MD Service: -- Author Type: --    Filed:  Encounter Date: 9/6/2019 Status: (Other)         Faby Linda  [id number] [place] [place] [address]               September 6, 2019    Dear Faby:    Our records indicate that you are due for a mammogram.    In the [place], one in nine women will develop breast cancer during their lifetime. While there is no way to prevent breast cancer, early detection provides the best opportunity for curing it.    For women over the age of 40, the American Cancer Society recommends a yearly clinical breast exam and a yearly mammogram. These practices have saved thousands of lives. We need your help to ensure that you are receiving optimal medical care.    Please make an appointment for a mammogram at your earliest convenience.([phone])    Sincerely,        Callie Titus MD

## 2021-06-19 NOTE — LETTER
Letter by Callie Titus MD at      Author: Callie Titus MD Service: -- Author Type: --    Filed:  Encounter Date: 8/8/2019 Status: (Other)         Faby Linda  [id number] [place] [address]             August 8, 2019         Dear Ms. Linda,    Below are the results from your recent visit:    Resulted Orders   Thyroid Stimulating Hormone (TSH)   Result Value Ref Range    TSH 0.81 0.30 - 5.00 uIU/mL   HM2(CBC w/o Differential)   Result Value Ref Range    WBC 5.1 4.0 - 11.0 thou/uL    RBC 4.78 3.80 - 5.40 mill/uL    Hemoglobin 15.1 12.0 - 16.0 g/dL    Hematocrit 45.0 35.0 - 47.0 %    MCV 94 80 - 100 fL    MCH 31.6 27.0 - 34.0 pg    MCHC 33.6 32.0 - 36.0 g/dL    RDW 11.6 11.0 - 14.5 %    Platelets 221 140 - 440 thou/uL    MPV 6.9 (L) 7.0 - 10.0 fL   Basic Metabolic Panel   Result Value Ref Range    Sodium 139 136 - 145 mmol/L    Potassium 4.3 3.5 - 5.0 mmol/L    Chloride 105 98 - 107 mmol/L    CO2 25 22 - 31 mmol/L    Anion Gap, Calculation 9 5 - 18 mmol/L    Glucose 88 70 - 125 mg/dL    Calcium 9.7 8.5 - 10.5 mg/dL    BUN 19 8 - 22 mg/dL    Creatinine 0.80 0.60 - 1.10 mg/dL    GFR MDRD Af Amer >60 >60 mL/min/1.73m2    GFR MDRD Non Af Amer >60 >60 mL/min/1.73m2    Narrative    Fasting Glucose reference range is 70-99 mg/dL per  American Diabetes Association (ADA) guidelines.   Lipid Cascade   Result Value Ref Range    Cholesterol 268 (H) <=199 mg/dL    Triglycerides 61 <=149 mg/dL    HDL Cholesterol 101 >=50 mg/dL    LDL Calculated 155 (H) <=129 mg/dL    Patient Fasting > 8hrs? Yes    Vitamin D, Total (25-Hydroxy)   Result Value Ref Range    Vitamin D, Total (25-Hydroxy) 40.4 30.0 - 80.0 ng/mL    Narrative    Deficiency <10.0 ng/mL  Insufficiency 10.0-29.9 ng/mL  Sufficiency 30.0-80.0 ng/mL  Toxicity (possible) >100.0 ng/mL       Faby, Your results are fine, but the cholesterol went up considerably.  (Possibly related to menopause?)   Focus on healthy eating and lifestyle.  Let's recheck it in 3-6  months and then decide if you need medication.    Please call with questions or contact us using [employer].    Sincerely,        Electronically signed by Callie Titus MD

## 2021-06-20 NOTE — LETTER
Letter by Curry Hameed CNP at      Author: Curry Hameed CNP Service: -- Author Type: --    Filed:  Encounter Date: 12/27/2019 Status: Signed         Faby Linda  [id number] [place] [place] [address]             December 27, 2019         Dear Ms. Linda,    Below are the results from your recent visit:    Resulted Orders   Basic Metabolic Panel   Result Value Ref Range    Sodium 141 136 - 145 mmol/L    Potassium 5.2 (H) 3.5 - 5.0 mmol/L    Chloride 104 98 - 107 mmol/L    CO2 28 22 - 31 mmol/L    Anion Gap, Calculation 9 5 - 18 mmol/L    Glucose 101 70 - 125 mg/dL    Calcium 10.0 8.5 - 10.5 mg/dL    BUN 19 8 - 22 mg/dL    Creatinine 0.96 0.60 - 1.10 mg/dL    GFR MDRD Af Amer >60 >60 mL/min/1.73m2    GFR MDRD Non Af Amer >60 >60 mL/min/1.73m2    Narrative    Fasting Glucose reference range is 70-99 mg/dL per  American Diabetes Association (ADA) guidelines.       Kidneys and electrolytes look stable!    Please call with questions or contact us using [facility].    Sincerely,         Curry Hameed CNP

## 2022-11-02 ENCOUNTER — TRANSFERRED RECORDS (OUTPATIENT)
Dept: HEALTH INFORMATION MANAGEMENT | Facility: CLINIC | Age: 52
End: 2022-11-02